# Patient Record
Sex: FEMALE | Race: BLACK OR AFRICAN AMERICAN | Employment: PART TIME | URBAN - METROPOLITAN AREA
[De-identification: names, ages, dates, MRNs, and addresses within clinical notes are randomized per-mention and may not be internally consistent; named-entity substitution may affect disease eponyms.]

---

## 2017-04-14 ENCOUNTER — HOSPITAL ENCOUNTER (OUTPATIENT)
Dept: MAMMOGRAPHY | Age: 42
Discharge: HOME | End: 2017-04-14
Payer: COMMERCIAL

## 2017-04-14 DIAGNOSIS — Z12.31 VISIT FOR SCREENING MAMMOGRAM: ICD-10-CM

## 2017-04-14 PROCEDURE — 77067 SCR MAMMO BI INCL CAD: CPT

## 2018-01-22 ENCOUNTER — HOSPITAL ENCOUNTER (OUTPATIENT)
Dept: LAB | Age: 43
Discharge: HOME | End: 2018-01-22

## 2018-01-22 LAB — SENTARA SPECIMEN COL,SENBCF: NORMAL

## 2018-01-22 PROCEDURE — 99001 SPECIMEN HANDLING PT-LAB: CPT | Performed by: FAMILY MEDICINE

## 2018-04-20 ENCOUNTER — HOSPITAL ENCOUNTER (OUTPATIENT)
Dept: MAMMOGRAPHY | Age: 43
Discharge: HOME | End: 2018-04-20
Payer: COMMERCIAL

## 2018-04-20 DIAGNOSIS — Z12.39 BREAST CANCER SCREENING: ICD-10-CM

## 2018-04-20 PROCEDURE — 77063 BREAST TOMOSYNTHESIS BI: CPT

## 2018-05-11 ENCOUNTER — HOSPITAL ENCOUNTER (OUTPATIENT)
Dept: ULTRASOUND IMAGING | Age: 43
Discharge: HOME | End: 2018-05-11
Payer: COMMERCIAL

## 2018-05-11 ENCOUNTER — HOSPITAL ENCOUNTER (OUTPATIENT)
Dept: MAMMOGRAPHY | Age: 43
Discharge: HOME | End: 2018-05-11
Payer: COMMERCIAL

## 2018-05-11 DIAGNOSIS — R92.8 ABNORMAL MAMMOGRAM: ICD-10-CM

## 2018-05-11 DIAGNOSIS — N63.0 BREAST MASS: ICD-10-CM

## 2018-05-11 PROCEDURE — 76642 ULTRASOUND BREAST LIMITED: CPT

## 2018-05-11 PROCEDURE — 77061 BREAST TOMOSYNTHESIS UNI: CPT

## 2018-09-24 ENCOUNTER — HOSPITAL ENCOUNTER (OUTPATIENT)
Dept: PHYSICAL THERAPY | Age: 43
Discharge: HOME | End: 2018-09-24
Payer: COMMERCIAL

## 2018-09-24 PROCEDURE — 97161 PT EVAL LOW COMPLEX 20 MIN: CPT | Performed by: PHYSICAL THERAPIST

## 2018-09-24 PROCEDURE — 97110 THERAPEUTIC EXERCISES: CPT | Performed by: PHYSICAL THERAPIST

## 2018-09-24 PROCEDURE — 97140 MANUAL THERAPY 1/> REGIONS: CPT | Performed by: PHYSICAL THERAPIST

## 2018-09-24 NOTE — PROGRESS NOTES
PT DAILY TREATMENT NOTE 12    Patient Name: Kiet Tejada  Date:2018  : 1975  [x]  Patient  Verified  Payor: Dylan Irizarry / Plan: [facility] / Product Type: Commerical /    In time:1000  Out time:1040  Total Treatment Time (min): 40  Visit #: 1 of 8    Treatment Area: Radiculopathy, cervical region [M54.12]    SUBJECTIVE  Pain Level (0-10 scale): 3-4  Any medication changes, allergies to medications, adverse drug reactions, diagnosis change, or new procedure performed?: [x] No    [] Yes (see summary sheet for update)  Subjective functional status/changes:   [x] No changes reported  See eval.    OBJECTIVE    10 min [x]Eval                  []Re-Eval       22 min Therapeutic Exercise:  [] See flow sheet :   Rationale: increase ROM, increase strength, improve coordination, improve balance and increase proprioception to improve the patients ability to tolerate daily activities with reduced symptoms. 8 min Manual Therapy:  SOR, DTM, STM to cervical musculature, manual cervical traction   Rationale: increase ROM, increase strength, improve coordination, improve balance and increase proprioception to improve the patients ability to tolerate daily activities with reduced symptoms. With   [] TE   [] TA   [] neuro   [] other: Patient Education: [x] Review HEP    [] Progressed/Changed HEP based on:   [] positioning   [] body mechanics   [] transfers   [] heat/ice application    [] other:      Other Objective/Functional Measures: See eval.     Pain Level (0-10 scale) post treatment: 1-2    ASSESSMENT/Changes in Function:  [x]  See Plan of Care           Progress towards goals / Updated goals:  Short Term Goals: To be accomplished in 2 weeks:  1. Pt to report [place] with HEP. Eval status: HEP issued and reviewed physically/verbally with pt    Long Term Goals: To be accomplished in 4 weeks:  1.   Pt to report score of 80 on FOTO, indicating improved tolerance to activity and reduced pain.   Eval status: 69 on initial FOTO  2. Pt to be able to tolerate a forearm plank x 1 minute to return to her work out tasks with improved activity tolerance. Eval status: pt limited to 10 seconds during forearm planks  4. Pt to demonstrate negative ULTT, indicating normal neurodynamics in the L UE and improved tolerance to activity and mobility. Eval status: positive ULTT for median, radial, and ulnar nerves  5. Pt to demonstrate full cervical ROM in all planes with no increased c/o pain to improve mobility and reduce fall risk.    Eval status: pt with 25% limited flexion, 25% limited extension, 25% limited R lateral flexion, 25% limited rotation R    PLAN  []  Upgrade activities as tolerated     [x]  Continue plan of care  []  Update interventions per flow sheet       []  Discharge due to:_  []  Other:_      Dyane Rom, PT 9/24/2018  11:24 AM    Future Appointments  Date Time Provider Elenita Cano   9/26/2018 3:00 PM Dutch Aranda, PT [facility]   10/3/2018 2:00 PM Dyane Rom, PT [facility]   10/5/2018 2:00 PM Alease Merles, PTA [facility]   10/10/2018 2:00 PM Dyane Rom, PT [facility]   10/12/2018 2:30 PM Alease Merles, PTA [facility]   10/17/2018 2:00 PM Alease Merles, PTA [facility]   10/19/2018 2:00 PM Alease Merles, PTA [facility]   10/24/2018 2:00 PM Dyane Rom, PT [facility]   10/26/2018 2:00 PM Alease Merles, PTA [facility]

## 2018-09-24 NOTE — PROGRESS NOTES
[facility] - [facility]  [address] [facility], [address] [phone] fax    Plan of Care/ Statement of Necessity for Physical Therapy Services           Patient name: Neil Dia Start of Care: 2018   Referral source: Brittanie Araujo MD : 1975    Medical Diagnosis: Radiculopathy, cervical region [M54.12]   Onset Date:2018    Treatment Diagnosis: cervical pain   Prior Hospitalization: see medical history Provider#: [id number]   Medications: Verified on Patient summary List    Comorbidities: HTN   Prior Level of Function: Pt reports full PLOF. 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 She works out and does yoga 3x per week. The Plan of Care and following information is based on the information from the initial evaluation. Assessment/ key information: Pt is a pleasant 38 y/o female who presents today with new onset of cervical and L UE symptoms after sleeping on a new, higher pillow early in 2018. Pt reports symptoms down the medial aspect of the arm and into the index finger DIP and other fingers with certain movements. Pt notes that she is currently unable to maintain a forearm plank for longer than 10 seconds (baseline is 1 minute), and is unable to tolerate yoga poses with WB UE. Pt denies any loss of strength, but notes difficulty with turning on the shower and with prolonged L UE tasks. Pt demonstrates slight reductions in cervical ROM, especially to the R. She notes positive results to all ULTT and notes symptom reduction with cervical traction/manual work today. Pt would benefit from skilled PT intervention to address the above limitations and return her to full PLOF.     Evaluation Complexity History MEDIUM  Complexity : 1-2 comorbidities / personal factors will impact the outcome/ POC ; Examination MEDIUM Complexity : 3 Standardized tests and measures addressing body structure, function, activity limitation and / or participation in recreation  ;Presentation LOW Complexity : Stable, uncomplicated  ;Clinical Decision Making MEDIUM Complexity : FOTO score of 26-74  Overall Complexity Rating: LOW   Problem List: pain affecting function, decrease ROM, decrease ADL/ functional abilitiies, decrease activity tolerance and decrease flexibility/ joint mobility   Treatment Plan may include any combination of the following: Therapeutic exercise, Therapeutic activities, Neuromuscular re-education, Physical agent/modality, Manual therapy, Patient education, Self Care training and Functional mobility training  Patient / Family readiness to learn indicated by: asking questions, trying to perform skills and interest  Persons(s) to be included in education: patient (P)  Barriers to Learning/Limitations: None  Patient Goal (s): my symptoms to go away  Patient Self Reported Health Status: good  Rehabilitation Potential: good  Short Term Goals: To be accomplished in 2 weeks:  1. Pt to report [place] with HEP. Eval status: HEP issued and reviewed physically/verbally with pt    Long Term Goals: To be accomplished in 4 weeks:  1. Pt to report score of 80 on FOTO, indicating improved tolerance to activity and reduced pain. Eval status: 69 on initial FOTO  2. Pt to be able to tolerate a forearm plank x 1 minute to return to her work out tasks with improved activity tolerance. Eval status: pt limited to 10 seconds during forearm planks  4. Pt to demonstrate negative ULTT, indicating normal neurodynamics in the L UE and improved tolerance to activity and mobility. Eval status: positive ULTT for median, radial, and ulnar nerves  5. Pt to demonstrate full cervical ROM in all planes with no increased c/o pain to improve mobility and reduce fall risk.    Eval status: pt with 25% limited flexion, 25% limited extension, 25% limited R lateral flexion, 25% limited rotation R    Frequency / Duration: Patient to be seen 2 times per week for 8 weeks. Patient/ Caregiver education and instruction: Diagnosis, prognosis, self care, activity modification and exercises   [x]  Plan of care has been reviewed with SIMA Neves, PT 9/24/2018 11:06 AM    ________________________________________________________________________    I certify that the above Therapy Services are being furnished while the patient is under my care. I agree with the treatment plan and certify that this therapy is necessary.     Physician's Signature:____________Date:_________TIME:________    ** Signature, Date and Time must be completed for valid certification **    Please sign and return to [facility] - [place] [address]  [address]   [facility], [address]  [phone] fax

## 2018-09-26 ENCOUNTER — HOSPITAL ENCOUNTER (OUTPATIENT)
Dept: PHYSICAL THERAPY | Age: 43
Discharge: HOME | End: 2018-09-26
Payer: COMMERCIAL

## 2018-09-26 PROCEDURE — 97110 THERAPEUTIC EXERCISES: CPT | Performed by: PHYSICAL THERAPIST

## 2018-09-26 PROCEDURE — 97112 NEUROMUSCULAR REEDUCATION: CPT | Performed by: PHYSICAL THERAPIST

## 2018-09-26 NOTE — PROGRESS NOTES
PT DAILY TREATMENT NOTE     Patient Name: Moni Petty  Date:2018  : 1975  [x]  Patient  Verified  Payor: Felicita Wakefield / Plan: [facility] / Product Type: Commerical /    In time:303  Out time:344  Total Treatment Time (min): 41  Visit #: 2 of 8    Treatment Area: Radiculopathy, cervical region [M54.12]    SUBJECTIVE  Pain Level (0-10 scale): 0  Any medication changes, allergies to medications, adverse drug reactions, diagnosis change, or new procedure performed?: [x] No    [] Yes (see summary sheet for update)  Subjective functional status/changes:   [] No changes reported  \"I feel better since starting therapy. I can tell the difference. \"    OBJECTIVE    30 min Therapeutic Exercise:  [x] See flow sheet :   Rationale: increase ROM, increase strength, improve coordination, improve balance and increase proprioception to improve the patients ability to tolerate daily activities with reduced symptoms.     11 min Neuromuscular Re-education:   [x] See flow sheet :   Rationale: increase ROM, increase strength, improve coordination, improve balance and increase proprioception to improve the patients ability to tolerate daily activities with reduced symptoms. With   [] TE   [] TA   [] neuro   [] other: Patient Education: [x] Review HEP    [] Progressed/Changed HEP based on:   [] positioning   [] body mechanics   [] transfers   [] heat/ice application    [] other:      Other Objective/Functional Measures: Pt requires visual and verbal cues for new exercises during initiation of POC today. Pain Level (0-10 scale) post treatment: 0    ASSESSMENT/Changes in Function: Pt notes benefit from initiation of POC today. She has slight onset of attempted forearm planks, which are halted as soon as symptoms occur. She is able to abolish nerve symptoms with Mulligan extension exercises. She has no symptoms at end of visit today.     Patient will continue to benefit from skilled PT services to modify and progress therapeutic interventions, address functional mobility deficits, address ROM deficits, address strength deficits, analyze and address soft tissue restrictions, analyze and cue movement patterns, analyze and modify body mechanics/ergonomics and assess and modify postural abnormalities to attain remaining goals. Progress towards goals / Updated goals:  Short Term Goals: To be accomplished in 2 weeks:  1. Pt to report [place] with HEP. Eval status: HEP issued and reviewed physically/verbally with pt     Long Term Goals: To be accomplished in 4 weeks:  1. Pt to report score of 80 on FOTO, indicating improved tolerance to activity and reduced pain. Eval status: 69 on initial FOTO  2. Pt to be able to tolerate a forearm plank x 1 minute to return to her work out tasks with improved activity tolerance. Eval status: pt limited to 10 seconds during forearm planks  4. Pt to demonstrate negative ULTT, indicating normal neurodynamics in the L UE and improved tolerance to activity and mobility. Eval status: positive ULTT for median, radial, and ulnar nerves  5. Pt to demonstrate full cervical ROM in all planes with no increased c/o pain to improve mobility and reduce fall risk.                         Eval status: pt with 25% limited flexion, 25% limited extension, 25% limited R lateral flexion, 25% limited rotation R    PLAN  []  Upgrade activities as tolerated     [x]  Continue plan of care  []  Update interventions per flow sheet       []  Discharge due to:_  []  Other:_      Francesca Stack PT 9/26/2018  3:20 PM    Future Appointments  Date Time Provider Elenita Cano   10/3/2018 2:00 PM [facility]   10/5/2018 2:00 PM Alexandra Weber PTA [facility]   10/10/2018 2:00 PM [facility]   10/12/2018 2:30 PM Alexandra Weber PTA [facility] 10/17/2018 2:00 PM Levon Blair PTA [facility]   10/19/2018 2:00 PM Levon Blair PTA [facility]   10/24/2018 2:00 PM Merrill Lombard, [place] [facility]   10/26/2018 2:00 PM [facility]

## 2018-10-03 ENCOUNTER — HOSPITAL ENCOUNTER (OUTPATIENT)
Dept: PHYSICAL THERAPY | Age: 43
Discharge: HOME | End: 2018-10-03
Payer: COMMERCIAL

## 2018-10-03 PROCEDURE — 97140 MANUAL THERAPY 1/> REGIONS: CPT | Performed by: PHYSICAL THERAPIST

## 2018-10-03 PROCEDURE — 97110 THERAPEUTIC EXERCISES: CPT | Performed by: PHYSICAL THERAPIST

## 2018-10-03 NOTE — PROGRESS NOTES
PT DAILY TREATMENT NOTE 10-18    Patient Name: Aaliyah Gilbert  Date:10/3/2018  : 1975  [x]  Patient  Verified  Payor: Catracho Huffman [facility] Product Type: Commer[facility] /    In time:202  Out time:240  Total Treatment Time (min): 38  Visit #: 3 of 8    Treatment Area: Radiculopathy, cervical region [M54.12]    SUBJECTIVE  Pain Level (0-10 scale): 0  Any medication changes, allergies to medications, adverse drug reactions, diagnosis change, or new procedure performed?: [x] No    [] Yes (see summary sheet for update)  Subjective functional status/changes:   [] No changes reported  \"I'm feeling better! I can go further with my nerve stretches and am having less symptoms. \"    OBJECTIVE     30 min Therapeutic Exercise:  [x] See flow sheet :   Rationale: increase ROM, increase strength, improve coordination, improve balance and increase proprioception to improve the patients ability to tolerate daily activities with reduced symptoms.        8 min Manual Therapy: PT provides manual cervical traction, SOR, STM to cervical spinal musculature   Rationale: increase ROM, increase strength, improve coordination, improve balance and increase proprioception to improve the patients ability to tolerate daily activities with reduced symptoms.         With   [] TE   [] TA   [] neuro   [] other: Patient Education: [x] Review HEP    [] Progressed/Changed HEP based on:   [] positioning   [] body mechanics   [] transfers   [] heat/ice application    [] other:       Other Objective/Functional Measures:        Pain Level (0-10 scale) post treatment: 0     ASSESSMENT/Changes in Function: Pt tolerates visit well.   She is noting improved range of motion and activity tolerance, especially with tasks that place tension on her upper extremity nerves.     Patient will continue to benefit from skilled PT services to modify and progress therapeutic interventions, address functional mobility deficits, address ROM deficits, address strength deficits, analyze and address soft tissue restrictions, analyze and cue movement patterns, analyze and modify body mechanics/ergonomics and assess and modify postural abnormalities to attain remaining goals.      Progress towards goals / Updated goals:  Short Term Goals: To be accomplished in 2 weeks:  1.  Pt to report [place] with HEP.                       Eval status: HEP issued and reviewed physically/verbally with pt      Long Term Goals: To be accomplished in 4 weeks:  1.  Pt to report score of 80 on FOTO, indicating improved tolerance to activity and reduced pain.                        Eval status: 69 on initial FOTO  2. Pt to be able to tolerate a forearm plank x 1 minute to return to her work out tasks with improved activity tolerance.                         Eval status: pt limited to 10 seconds during forearm planks  4.  Pt to demonstrate negative ULTT, indicating normal neurodynamics in the [facility] [address] and improved tolerance to activity and mobility.                         Eval status: positive ULTT for median, radial, and ulnar nerves  5.  Pt to demonstrate full cervical ROM in all planes with no increased c/o pain to improve mobility and reduce fall risk.                        Eval status: pt with 25% limited flexion, 25% limited extension, 25% limited R lateral flexion, 25% limited rotation R    PLAN  []  Upgrade activities as tolerated     [x]  Continue plan of care  []  Update interventions per flow sheet       []  Discharge due to:_  []  Other:_      Scout Jones PT 10/3/2018  2:22 PM    Future Appointments  Date Time Provider Elenita Cano   10/5/2018 2:00 PM [facility]   10/10/2018 2:00 PM [facility]   10/12/2018 2:30 PM Quincy Chirinos PTA [facility]   10/17/2018 2:00 PM Quincy Chirinos PTA [facility]   10/19/2018 2:00 PM Quincy Chirinos PTA [facility]   10/24/2018 2:00 PM Scout Jones PT [facility]   10/26/2018 2:00 PM Gama Begun, SIMA [facility]

## 2018-10-05 ENCOUNTER — HOSPITAL ENCOUNTER (OUTPATIENT)
Dept: PHYSICAL THERAPY | Age: 43
Discharge: HOME | End: 2018-10-05
Payer: COMMERCIAL

## 2018-10-05 PROCEDURE — 97110 THERAPEUTIC EXERCISES: CPT

## 2018-10-05 PROCEDURE — 97112 NEUROMUSCULAR REEDUCATION: CPT

## 2018-10-05 PROCEDURE — 97140 MANUAL THERAPY 1/> REGIONS: CPT

## 2018-10-05 NOTE — PROGRESS NOTES
PT DAILY TREATMENT NOTE 10-18    Patient Name: Beata Sherwood  Date:10/5/2018  : 1975  [x]  Patient  Verified  Payor: Varun Lozano / Plan: [facility] / Product Type: Commerical /    In time:200  Out time:238  Total Treatment Time (min): 38  Visit #: 4 of 8      Treatment Area: Radiculopathy, cervical region [M54.12]    SUBJECTIVE  Pain Level (0-10 scale): 0  Any medication changes, allergies to medications, adverse drug reactions, diagnosis change, or new procedure performed?: [x] No    [] Yes (see summary sheet for update)  Subjective functional status/changes:   [] No changes reported  \"No pain. \"    OBJECTIVE    Modality rationale: patient declined   Min Type Additional Details    [] Estim:  []Unatt       []IFC  []Premod                        []Other:  []w/ice   []w/heat  Position:  Location:    [] Estim: []Att    []TENS instruct  []NMES                    []Other:  []w/US   []w/ice   []w/heat  Position:  Location:    []  Traction: [] Cervical       []Lumbar                       [] Prone          []Supine                       []Intermittent   []Continuous Lbs:  [] before manual  [] after manual    []  Ultrasound: []Continuous   [] Pulsed                           []1MHz   []3MHz W/cm2:  Location:    []  Iontophoresis with dexamethasone         Location: [] Take home patch   [] In clinic    []  Ice     []  heat  []  Ice massage  []  Laser   []  Anodyne Position:  Location:    []  Laser with stim  []  Other:  Position:  Location:    []  Vasopneumatic Device Pressure:       [] lo [] med [] hi   Temperature: [] lo [] med [] hi   [] Skin assessment post-treatment:  []intact []redness- no adverse reaction    []redness - adverse reaction:     10 min Therapeutic Exercise:  [x] See flow sheet :   Rationale: increase ROM and increase strength to improve the patients ability to perform ADLs    20 min Neuromuscular Re-education:  [x]  See flow sheet : postural re-ed activities   Rationale: increase ROM, increase strength, improve coordination, improve balance and increase proprioception  to improve the patients ability to improve mobility and upright posture     8 min Manual Therapy:  SOR, STM to c/s paraspinals and B UT, gentle manual cervical traction   Rationale: decrease pain, increase ROM, increase tissue extensibility, decrease trigger points and increase postural awareness to improve mobility and positional tolerance          With   [x] TE   [] TA   [x] neuro   [] other: Patient Education: [x] Review HEP    [] Progressed/Changed HEP based on:   [x] positioning   [x] body mechanics   [] transfers   [] heat/ice application    [] other:      Other Objective/Functional Measures:      Pain Level (0-10 scale) post treatment: 0    ASSESSMENT/Changes in Function: Pt reports improvements with pain and radicular sx recently. She reports that she was able to complete table push ups without sx. Her mobility and posture have improved nicely. Patient will continue to benefit from skilled PT services to modify and progress therapeutic interventions, address functional mobility deficits, address ROM deficits, address strength deficits, analyze and address soft tissue restrictions, analyze and cue movement patterns, analyze and modify body mechanics/ergonomics, assess and modify postural abnormalities, address imbalance/dizziness and instruct in home and community integration to attain remaining goals. [x]  See Plan of Care  []  See progress note/recertification  []  See Discharge Summary         Progress towards goals / Updated goals:  Short Term Goals: To be accomplished in 2 weeks:  1.  Pt to report [place] with HEP. Eval status: HEP issued and reviewed physically/verbally with pt     MET  Long Term Goals: To be accomplished in 4 weeks:  1.  Pt to report score of 80 on FOTO, indicating improved tolerance to activity and reduced pain.    Eval status: 69 on initial FOTO   Assess at 30 day [place]  2. Pt to be able to tolerate a forearm plank x 1 minute to return to her work out tasks with improved activity tolerance. Eval status: pt limited to 10 seconds during forearm planks   Attempt NV  4.  Pt to demonstrate negative ULTT, indicating normal neurodynamics in the L UE and improved tolerance to activity and mobility. Eval status: positive ULTT for median, radial, and ulnar nerves   Reports improved radicular sx  5.  Pt to demonstrate full cervical ROM in all planes with no increased c/o pain to improve mobility and reduce fall risk.    Eval status: pt with 25% limited flexion, 25% limited extension, 25% limited R lateral flexion, 25% limited rotation R   Improving with motion     PLAN  []  Upgrade activities as tolerated     [x]  Continue plan of care  []  Update interventions per flow sheet       []  Discharge due to:_  []  Other:_      Peter Brito PTA, [facility] 10/5/2018  2:42 PM    Future Appointments  Date Time Provider Elenita Cano   10/10/2018 2:00 PM [facility]   10/12/2018 2:30 PM [facility]   10/16/2018 2:00 PM [address] [place] [address] [facility]   10/19/2018 2:00 PM Peter Brito PTA [facility]   10/22/2018 2:00 PM [facility]   10/26/2018 2:00 PM [facility]

## 2018-10-10 ENCOUNTER — HOSPITAL ENCOUNTER (OUTPATIENT)
Dept: PHYSICAL THERAPY | Age: 43
Discharge: HOME | End: 2018-10-10
Payer: COMMERCIAL

## 2018-10-10 PROCEDURE — 97110 THERAPEUTIC EXERCISES: CPT | Performed by: PHYSICAL THERAPIST

## 2018-10-10 PROCEDURE — 97112 NEUROMUSCULAR REEDUCATION: CPT | Performed by: PHYSICAL THERAPIST

## 2018-10-10 NOTE — PROGRESS NOTES
PT DAILY TREATMENT NOTE 10-18    Patient Name: James Gaspar  Date:10/10/2018  : 1975  [x]  Patient  Verified  Payor: Estela Prieto / [facility] / Product Type: Commerical /    In time:202  Out time:240  Total Treatment Time (min): 38  Visit #: 5 of 8    Treatment Area: Radiculopathy, cervical region [M54.12]    SUBJECTIVE  Pain Level (0-10 scale): 0  Any medication changes, allergies to medications, adverse drug reactions, diagnosis change, or new procedure performed?: [x] No    [] Yes (see summary sheet for update)  Subjective functional status/changes:   [x] No changes reported    OBJECTIVE      23 min Therapeutic Exercise:  [x] See flow sheet :   Rationale: increase ROM, increase strength, improve coordination, improve balance and increase proprioception to improve the patients ability to tolerate daily activities with reduced symptoms.      15 min Neuromuscular Re-education:  [x] See flow sheet :   Rationale: increase ROM, increase strength, improve coordination, improve balance and increase proprioception to improve the patients ability to tolerate daily activities with reduced symptoms. With   [] TE   [] TA   [] neuro   [] other: Patient Education: [x] Review HEP    [] Progressed/Changed HEP based on:   [] positioning   [] body mechanics   [] transfers   [] heat/ice application    [] other:        Other Objective/Functional Measures:   See updated goals.      Pain Level (0-10 scale) post treatment: 0      ASSESSMENT/Changes in Function: Pt tolerates visit well. She is able to perform forearm planks from her toes today with only minimal onset of UE symptoms from neural tension.   She is pleased with her progress thus far.      Patient will continue to benefit from skilled PT services to modify and progress therapeutic interventions, address functional mobility deficits, address ROM deficits, address strength deficits, analyze and address soft tissue restrictions, analyze and cue movement patterns, analyze and modify body mechanics/ergonomics and assess and modify postural abnormalities to attain remaining goals.      Progress towards goals / Updated goals:  Short Term Goals: To be accomplished in 2 weeks:  1.  Pt to report [place] with HEP.                       Eval status: HEP issued and reviewed physically/verbally with pt      Long Term Goals: To be accomplished in 4 weeks:  1.  Pt to report score of 80 on FOTO, indicating improved tolerance to activity and reduced pain.                        Eval status: 69 on initial FOTO  2. Pt to be able to tolerate a forearm plank x 1 minute to return to her work out tasks with improved activity tolerance.                         Eval status: pt limited to 10 seconds during forearm planks    Current: able to hold a plank from toes x 30 seconds  4.  Pt to demonstrate negative ULTT, indicating normal neurodynamics in the L UE and improved tolerance to activity and mobility.                         Eval status: positive ULTT for median, radial, and ulnar nerves  5.  Pt to demonstrate full cervical ROM in all planes with no increased c/o pain to improve mobility and reduce fall risk.                        Eval status: pt with 25% limited flexion, 25% limited extension, 25% limited R lateral flexion, 25% limited rotation R    PLAN  []  Upgrade activities as tolerated     [x]  Continue plan of care  []  Update interventions per flow sheet       []  Discharge due to:_  []  Other:_      Stone Shine, PT 10/10/2018  2:22 PM    Future Appointments  Date Time Provider Elenita Cano   10/12/2018 2:30 PM Blanche Johnson PTA [facility]   10/16/2018 2:00 PM [facility] PT [address] [facility]   10/19/2018 2:00 PM SIMA Hansen [facility]   10/22/2018 2:00 PM SIMA Hansen [facility]   10/26/2018 2:00 PM [facility]

## 2018-10-12 ENCOUNTER — HOSPITAL ENCOUNTER (OUTPATIENT)
Dept: PHYSICAL THERAPY | Age: 43
Discharge: HOME | End: 2018-10-12
Payer: COMMERCIAL

## 2018-10-12 PROCEDURE — 97110 THERAPEUTIC EXERCISES: CPT

## 2018-10-12 PROCEDURE — 97112 NEUROMUSCULAR REEDUCATION: CPT

## 2018-10-12 NOTE — PROGRESS NOTES
PT DAILY TREATMENT NOTE 10-18    Patient Name: Vanita Valles  Date:10/12/2018  : 1975  [x]  Patient  Verified  Payor: Mary Spears / [facility] Product Type: Commer[facility] /    In time:200  Out time:240  Total Treatment Time (min): 40  Visit #: 6 of 8    Treatment Area: Radiculopathy, cervical region [M54.12]    SUBJECTIVE  Pain Level (0-10 scale): 0  Any medication changes, allergies to medications, adverse drug reactions, diagnosis change, or new procedure performed?: [x] No    [] Yes (see summary sheet for update)  Subjective functional status/changes:   [] No changes reported  \"No pain at all. \"    OBJECTIVE    Modality rationale: patient declined   Min Type Additional Details    [] Estim:  []Unatt       []IFC  []Premod                        []Other:  []w/ice   []w/heat  Position:  Location:    [] Estim: []Att    []TENS instruct  []NMES                    []Other:  []w/US   []w/ice   []w/heat  Position:  Location:    []  Traction: [] Cervical       []Lumbar                       [] Prone          []Supine                       []Intermittent   []Continuous Lbs:  [] before manual  [] after manual    []  Ultrasound: []Continuous   [] Pulsed                           []1MHz   []3MHz W/cm2:  Location:    []  Iontophoresis with dexamethasone         Location: [] Take home patch   [] In clinic    []  Ice     []  heat  []  Ice massage  []  Laser   []  Anodyne Position:  Location:    []  Laser with stim  []  Other:  Position:  Location:    []  Vasopneumatic Device Pressure:       [] lo [] med [] hi   Temperature: [] lo [] med [] hi   [] Skin assessment post-treatment:  []intact []redness- no adverse reaction    []redness - adverse reaction:     15 min Therapeutic Exercise:  [x] See flow sheet :   Rationale: increase ROM and increase strength to improve the patients ability to perform ADLs    25 min Neuromuscular Re-education:  [x]  See flow sheet : postural re-ed activities   Rationale: increase ROM, increase strength, improve coordination, improve balance and increase proprioception  to improve the patients ability to improve mobility and upright posture        With   [x] TE   [] TA   [x] neuro   [] other: Patient Education: [x] Review HEP    [] Progressed/Changed HEP based on:   [x] positioning   [x] body mechanics   [] transfers   [] heat/ice application    [] other:      Other Objective/Functional Measures:      Pain Level (0-10 scale) post treatment: 0    ASSESSMENT/Changes in Function: Pt reports improvements with pain and radicular sx. She reports that she was having radicular sx with planks when first starting therapy, but no longer has radicular sx during planks. We will likely transition to an updated HEP at the end of the current POC. Patient will continue to benefit from skilled PT services to modify and progress therapeutic interventions, address functional mobility deficits, address ROM deficits, address strength deficits, analyze and address soft tissue restrictions, analyze and cue movement patterns, analyze and modify body mechanics/ergonomics, assess and modify postural abnormalities, address imbalance/dizziness and instruct in home and community integration to attain remaining goals. [x]  See Plan of Care  []  See progress note/recertification  []  See Discharge Summary         Progress towards goals / Updated goals:  Short Term Goals: To be accomplished in 2 weeks:  1.  Pt to report [place] with HEP. Eval status: HEP issued and reviewed physically/verbally with pt   [address] be accomplished in 4 weeks:  1.  Pt to report score of 80 on FOTO, indicating improved tolerance to activity and reduced pain. Eval status: 69 on initial FOTO   Assess at 30 day [place]  2. Pt to be able to tolerate a forearm plank x 1 minute to return to her work out tasks with improved activity tolerance.     Eval status: pt limited to 10 seconds during forearm planks   Current: able to hold a plank from toes x 30 seconds  4.  Pt to demonstrate negative ULTT, indicating normal neurodynamics in the L UE and improved tolerance to activity and mobility. Eval status: positive ULTT for median, radial, and ulnar nerves   Reports improvements with radicular sx  5.  Pt to demonstrate full cervical ROM in all planes with no increased c/o pain to improve mobility and reduce fall risk.    Eval status: pt with 25% limited flexion, 25% limited extension, 25% limited R lateral flexion, 25% limited rotation R   Improving wonderfully with motion    PLAN  []  Upgrade activities as tolerated     [x]  Continue plan of care  []  Update interventions per flow sheet       []  Discharge due to:_  []  Other:_      Martin Bernal PTA, [facility] 10/12/2018  2:44 PM    Future Appointments  Date Time Provider Elenita Cano   10/12/2018 2:30 PM [facility]   10/16/2018 2:00 PM [facility] PT [address] [facility]   10/19/2018 2:00 PM SIMA Rasmussen [facility]   10/22/2018 2:00 PM [facility]   10/26/2018 2:00 PM Martin Bernal PTA [facility]

## 2018-10-16 ENCOUNTER — HOSPITAL ENCOUNTER (OUTPATIENT)
Dept: PHYSICAL THERAPY | Age: 43
Discharge: HOME | End: 2018-10-16
Payer: COMMERCIAL

## 2018-10-16 PROCEDURE — 97112 NEUROMUSCULAR REEDUCATION: CPT

## 2018-10-16 PROCEDURE — 97110 THERAPEUTIC EXERCISES: CPT

## 2018-10-17 ENCOUNTER — APPOINTMENT (OUTPATIENT)
Dept: PHYSICAL THERAPY | Age: 43
End: 2018-10-17
Payer: COMMERCIAL

## 2018-10-19 ENCOUNTER — HOSPITAL ENCOUNTER (OUTPATIENT)
Dept: PHYSICAL THERAPY | Age: 43
Discharge: HOME | End: 2018-10-19
Payer: COMMERCIAL

## 2018-10-19 PROCEDURE — 97110 THERAPEUTIC EXERCISES: CPT

## 2018-10-19 PROCEDURE — 97112 NEUROMUSCULAR REEDUCATION: CPT

## 2018-10-19 NOTE — PROGRESS NOTES
PT DAILY TREATMENT NOTE 10-18    Patient Name: Carlo Alvarez  Date:10/19/2018  : 1975  [x]  Patient  Verified  Payor: Teresa Knight / Plan: [place] [facility] / Product Type: Commerical /    In time:200  Out time:235  Total Treatment Time (min): 35  Visit #: 8 of 8    Treatment Area: Radiculopathy, cervical region [M54.12]    SUBJECTIVE  Pain Level (0-10 scale): 0  Any medication changes, allergies to medications, adverse drug reactions, diagnosis change, or new procedure performed?: [x] No    [] Yes (see summary sheet for update)  Subjective functional status/changes:   [] No changes reported  \"No pain. \"    OBJECTIVE    Modality rationale: patient declined   Type Additional Details   [] Estim:  []Unatt       []IFC  []Premod                        []Other:  []w/ice   []w/heat  Position:  Location:   [] Estim: []Att    []TENS instruct  []NMES                    []Other:  []w/US   []w/ice   []w/heat  Position:  Location:   []  Traction: [] Cervical       []Lumbar                       [] Prone          []Supine                       []Intermittent   []Continuous Lbs:  [] before manual  [] after manual   []  Ultrasound: []Continuous   [] Pulsed                           []1MHz   []3MHz W/cm2:  Location:   []  Iontophoresis with dexamethasone         Location: [] Take home patch   [] In clinic   []  Ice     []  heat  []  Ice massage  []  Laser   []  Anodyne Position:  Location:   []  Laser with stim  []  Other:  Position:  Location:   []  Vasopneumatic Device Pressure:       [] lo [] med [] hi   Temperature: [] lo [] med [] hi   [] Skin assessment post-treatment:  []intact []redness- no adverse reaction    []redness - adverse reaction:     10 min Therapeutic Exercise:  [x] See flow sheet :   Rationale: increase ROM and increase strength to improve the patients ability to perform ADLs    25 min Neuromuscular Re-education:  [x]  See flow sheet : postural re-ed activities   Rationale: increase ROM, increase strength, improve coordination, improve balance and increase proprioception  to improve the patients ability to improve mobility and upright posture        With   [x] TE   [] TA   [x] neuro   [] other: Patient Education: [x] Review HEP    [] Progressed/Changed HEP based on:   [x] positioning   [x] body mechanics   [] transfers   [] heat/ice application    [] other:      Other Objective/Functional Measures:   FOTO 96     Pain Level (0-10 scale) post treatment: 0    ASSESSMENT/Changes in Function: Ms. Scott Teran has been a pleasure to treat and reports 95% improvement since beginning therapy. Pt reports ease with all ADLs and has returned to the gym. She reports no radicular sx and has been pain free recently. She reports no functional deficits at this time. We are discharging to an updated HEP at this time. []  See Plan of Care  []  See progress note/recertification  [x]  See Discharge Summary         Progress towards goals / Updated goals:  Short Term Goals: To be accomplished in 2 weeks:  1.  Pt to report [place] with HEP.                       Eval status: HEP issued and reviewed physically/verbally with pt                        KAREN    Long Term Goals: To be accomplished in 4 weeks:  1.  Pt to report score of 80 on FOTO, indicating improved tolerance to activity and reduced pain.                        Eval status: 69 on initial FOTO                        MET; 96  2. Pt to be able to tolerate a forearm plank x 1 minute to return to her work out tasks with improved activity tolerance.                         Eval status: pt limited to 10 seconds during forearm planks                        MET  4.  Pt to demonstrate negative ULTT, indicating normal neurodynamics in the L UE and improved tolerance to activity and mobility.                         Eval status: positive ULTT for median, radial, and ulnar nerves                        MET  5.  Pt to demonstrate full cervical ROM in all planes with no increased c/o pain to improve mobility and reduce fall risk.                        Eval status: pt with 25% limited flexion, 25% limited extension, 25% limited R lateral flexion, 25% limited rotation R                        MET    Functional Gains: planks, pilates, yoga, returning to the gym, ADLs, pain, radicular sx  Functional Deficits: none to report  % improvement: 95%  Pain   Average: 0/10       Best: 0/10     Worst: 0/10  Patient Goal: \"do weight bearing exercises without having tingling in my hands.  \"    PLAN  []  Upgrade activities as tolerated     []  Continue plan of care  []  Update interventions per flow sheet       [x]  Discharge due to: [address]  []  Other:_      Gama Felder PTA, CSCS 10/19/2018  2:38 PM    Future Appointments   Date Time Provider Elenita Cano   10/19/2018  2:00 PM [facility]les [facility]

## 2018-10-22 NOTE — PROGRESS NOTES
[facility]  [address], [address]  [phone] fax    Discharge Summary  Patient name: Laure Hodge Start of Care: 2018   Referral source: Alison Armijo MD : 1975                Medical Diagnosis: Radiculopathy, cervical region [M54.12]    Onset Date:2018                Treatment Diagnosis: cervical pain   Prior Hospitalization: see medical history Provider#: [id number]   Medications: Verified on Patient summary List    Comorbidities: HTN   Prior Level of Function: Pt reports full PLOF. 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 She works out and does yoga 3x per week. Visits from Start of Care: 8    Missed Visits: 0    Reporting Period : 18 to 10/19/18    Short Term Goals: To be accomplished in 2 weeks:  1.  Pt to report [place] with HEP.                       Eval status: HEP issued and reviewed physically/verbally with pt                        [facility]    Long Term Goals: To be accomplished in 4 weeks:  1.  Pt to report score of 80 on FOTO, indicating improved tolerance to activity and reduced pain.                        Eval status: 69 on initial FOTO                        MET; 96  2. Pt to be able to tolerate a forearm plank x 1 minute to return to her work out tasks with improved activity tolerance.                         Eval status: pt limited to 10 seconds during forearm planks                        MET  4.  Pt to demonstrate negative ULTT, indicating normal neurodynamics in the L UE and improved tolerance to activity and mobility.                         Eval status: positive ULTT for median, radial, and ulnar nerves                        MET  5.  Pt to demonstrate full cervical ROM in all planes with no increased c/o pain to improve mobility and reduce fall risk.                        Eval status: pt with 25% limited flexion, 25% limited extension, 25% limited R lateral flexion, 25% limited rotation R                        MET     Functional Gains: planks, pilates, yoga, returning to the gym, ADLs, pain, radicular sx  Functional Deficits: none to report  % improvement: 95%  Pain   Average: 0/10                  Best: 0/10                Worst: 0/10  Patient Goal: \"do weight bearing exercises without having tingling in my hands. \"    Assessment/Summary of care: Ms. Raji Gomes has been a pleasure to treat and reports 95% improvement since beginning therapy. Pt reports ease with all ADLs and has returned to the gym. She reports no radicular sx and has been pain free recently. She reports no functional deficits at this time. We are discharging to an updated HEP at this time.       RECOMMENDATIONS:  [x]Discontinue therapy: [x]Patient has reached or is progressing toward set goals      []Patient is non-compliant or has abdicated      []Due to lack of appreciable progress towards set goals    Maryam Cuevas, PT 10/22/2018 9:49 AM

## 2018-10-24 ENCOUNTER — APPOINTMENT (OUTPATIENT)
Dept: PHYSICAL THERAPY | Age: 43
End: 2018-10-24
Payer: COMMERCIAL

## 2018-10-26 ENCOUNTER — APPOINTMENT (OUTPATIENT)
Dept: PHYSICAL THERAPY | Age: 43
End: 2018-10-26
Payer: COMMERCIAL

## 2019-02-27 ENCOUNTER — HOSPITAL ENCOUNTER (OUTPATIENT)
Dept: LAB | Age: 44
Discharge: HOME | End: 2019-02-27

## 2019-02-27 LAB — SENTARA SPECIMEN COL,SENBCF: NORMAL

## 2019-02-27 PROCEDURE — 99001 SPECIMEN HANDLING PT-LAB: CPT

## 2019-04-26 ENCOUNTER — HOSPITAL ENCOUNTER (OUTPATIENT)
Dept: MAMMOGRAPHY | Age: 44
Discharge: HOME | End: 2019-04-26
Payer: COMMERCIAL

## 2019-04-26 DIAGNOSIS — Z12.31 VISIT FOR SCREENING MAMMOGRAM: ICD-10-CM

## 2019-04-26 PROCEDURE — 77063 BREAST TOMOSYNTHESIS BI: CPT

## 2020-06-19 ENCOUNTER — HOSPITAL ENCOUNTER (OUTPATIENT)
Dept: MAMMOGRAPHY | Age: 45
Discharge: HOME | End: 2020-06-19
Payer: COMMERCIAL

## 2020-06-19 DIAGNOSIS — Z12.31 VISIT FOR SCREENING MAMMOGRAM: ICD-10-CM

## 2020-06-19 PROCEDURE — 77063 BREAST TOMOSYNTHESIS BI: CPT

## 2020-07-01 ENCOUNTER — HOSPITAL ENCOUNTER (OUTPATIENT)
Dept: PHYSICAL THERAPY | Age: 45
Discharge: HOME | End: 2020-07-01
Payer: COMMERCIAL

## 2020-07-01 PROCEDURE — 97530 THERAPEUTIC ACTIVITIES: CPT

## 2020-07-01 PROCEDURE — 97110 THERAPEUTIC EXERCISES: CPT

## 2020-07-01 PROCEDURE — 97161 PT EVAL LOW COMPLEX 20 MIN: CPT

## 2020-07-01 NOTE — PROGRESS NOTES
[facility] [address]  [address], [address] [phone] fax    Plan of Care/ Statement of Necessity for Physical Therapy Services           Patient name: Jeremiah Mustafa Start of Care: 2020   Referral source: Joseph Hoyos MD : 1975    Medical Diagnosis: Pain in right knee [M25.561]  Pain in left knee [M25.562]  Payor: Orquidea Claire / Plan: [address] [place] HMO / Product Type: HMO /  Onset Date:Aug 2019    Treatment Diagnosis: B knee pain   Prior Hospitalization: see medical history Provider#: [id number]   Medications: Verified on Patient summary List    Comorbidities: HTN   Prior Level of Function: legal work part-time. Functionally independent, taking ballet class, gym workout, yoga    The Plan of Care and following information is based on the information from the initial evaluation. Assessment/ key information: Patient is a 50 y. o.female presenting with Pain in right knee [M25.561]  Pain in left knee [M25.562]. Ms. Melody Blackman presents to initial PT evaluation with c/o B knee pain worsening on and off since beginning a new adult ballet class. She displays altered patellar tracking, B pes planus with hypermobility of B midtarsal joint, and mild glut/hip weakness. Ligamentous and meniscal integrity testing unremarkable. Symptoms consistent with patellofemoral syndrome. Patient will benefit from skilled PT services to address deficits and facilitate return to premorbid activity level and promote improved quality of life. Evaluation Complexity History LOW Complexity : Zero comorbidities / personal factors that will impact the outcome / POC; Examination LOW Complexity : 1-2 Standardized tests and measures addressing body structure, function, activity limitation and / or participation in recreation  ;Presentation MEDIUM Complexity : Evolving with changing characteristics  ; Clinical Decision Making MEDIUM Complexity : FOTO score of 26-74  Overall Complexity Rating: LOW   Problem List: pain affecting function, decrease ROM, decrease strength, edema affecting function, impaired gait/ balance, decrease ADL/ functional abilitiies, decrease activity tolerance, decrease flexibility/ joint mobility and decrease transfer abilities   Treatment Plan may include any combination of the following: Therapeutic exercise, Therapeutic activities, Neuromuscular re-education, Physical agent/modality, Gait/balance training, Manual therapy, Aquatic therapy, Patient education, Self Care training, Functional mobility training, Home safety training and Stair training  Patient / Family readiness to learn indicated by: asking questions, trying to perform skills and interest  Persons(s) to be included in education: patient (P)  Barriers to Learning/Limitations: None  Patient Goal (s): less pain, more mobility.   Patient Self Reported Health Status: good  Rehabilitation Potential: good  Short Term Goals: To be accomplished in 1 weeks:  1. Therapist to establish HEP for strength and ROM to improve ease with ADLs/gait. Long Term Goals: To be accomplished in 4 weeks:  1. Patient will be independent with HEP to improve carryover of functional gains with ADLs between visits. Eval Status:n/a  2. . Pt will increase FOTO score to 78 points to demonstrate improved functional mobility. Eval Status: FOTO: 61  3. Pt will increase B knee extension/hip extension/ hip flexion/hip abduction strength to grossly 5/5 to improve ease with gait. Eval Status:    Knee extension: 4+/5    Hip extension: 3/5   Hip flexion:4+/5    Hip abduction: 4+/5  4. Patient will display proper jump/ jump form without cuing from therapist to improve ease with ballet workouts. Frequency / Duration: Patient to be seen 2 times per week for 4 weeks.     Patient/ Caregiver education and instruction: Diagnosis, prognosis, self care, activity modification and exercises   [x]  Plan of care has been reviewed with SIMA Holloway, PT 7/1/2020 2:57 PM    ________________________________________________________________________    I certify that the above Therapy Services are being furnished while the patient is under my care. I agree with the treatment plan and certify that this therapy is necessary.     Physician's Signature:____________Date:_________TIME:________    ** Signature, Date and Time must be completed for valid certification **    Please sign and return to [facility] 45  [address], [address] [phone] fax

## 2020-07-01 NOTE — PROGRESS NOTES
PT DAILY TREATMENT NOTE 8-14    Patient Name: Carol Galeano  Date:2020  : 1975  [x]  Patient  Verified  Payor: Ela Wiseman / Plan: [address] [employer] / Product Type: HMO /    In time:330  Out time:415  Total Treatment Time (min): 45  Visit #: 1 of 8    Treatment Area: Pain in right knee [M25.561]  Pain in left knee [M25.562]    SUBJECTIVE  Pain Level (0-10 scale): 4  Any medication changes, allergies to medications, adverse drug reactions, diagnosis change, or new procedure performed?: [x] No    [] Yes (see summary sheet for update)  Subjective functional status/changes:   [x] See Eval form in paper chart     OBJECTIVE      10 min [x]Eval                  []Re-Eval         10 min Therapeutic Exercise:  [x] See flow sheet :   Rationale: increase ROM, increase strength, improve coordination, improve balance and increase proprioception to improve the patients ability to normalize gait/perform ADLs. 25 min Therapeutic Activity:  [x]  See flow sheet :review of body mechanics with ballet asks, squatting, lifting, jumping form. Review of proper footwear and foot type/insert to improve patellar alignment and tracking   Rationale: increase ROM, increase strength, improve coordination, improve balance and increase proprioception  to improve the patients ability to perform daily activities without pain. With   [] TE   [] TA   [] neuro   [] other: Patient Education: [x] Review HEP    [] Progressed/Changed HEP based on:   [] positioning   [] body mechanics   [] transfers   [] heat/ice application    [] other:           Pain Level (0-10 scale) post treatment: 4    ASSESSMENT:   [x]  See Evaluation          Goals:  Short Term Goals: To be accomplished in 1 weeks:  1. Therapist to establish HEP for strength and ROM to improve ease with ADLs/gait. Long Term Goals: To be accomplished in 4 weeks:  1. Patient will be independent with HEP to improve carryover of functional gains with ADLs between visits. Eval Status:n/a  2. . Pt will increase FOTO score to 78 points to demonstrate improved functional mobility. Eval Status: FOTO: 61  3. Pt will increase B knee extension/hip extension/ hip flexion/hip abduction strength to grossly 5/5 to improve ease with gait. Eval Status:    Knee extension: 4+/5    Hip extension: 3/5   Hip flexion:4+/5    Hip abduction: 4+/5  4. Patient will display proper jump/ jump form without cuing from therapist to improve ease with ballet workouts.      PLAN      [x]  Continue plan of care           Vani Tejada, PT 7/1/2020  2:58 PM

## 2020-07-09 ENCOUNTER — HOSPITAL ENCOUNTER (OUTPATIENT)
Dept: PHYSICAL THERAPY | Age: 45
Discharge: HOME | End: 2020-07-09
Payer: COMMERCIAL

## 2020-07-09 PROCEDURE — 97530 THERAPEUTIC ACTIVITIES: CPT

## 2020-07-09 PROCEDURE — 97110 THERAPEUTIC EXERCISES: CPT

## 2020-07-09 PROCEDURE — 97112 NEUROMUSCULAR REEDUCATION: CPT

## 2020-07-09 NOTE — PROGRESS NOTES
PT DAILY TREATMENT NOTE 10-18    Patient Name: Kenia De Paz  Date:2020  : 1975  [x]  Patient  Verified  Payor: Henrique Gonzalez / Plan: [address] [place] [address] [employer] / Product Type: HMO /    In time:115  Out time:213  Total Treatment Time (min): 62  Visit #: 2 of 8    Treatment Area: Pain in right knee [M25.561]  Pain in left knee [M25.562]    SUBJECTIVE  Pain Level (0-10 scale): 3  Any medication changes, allergies to medications, adverse drug reactions, diagnosis change, or new procedure performed?: [x] No    [] Yes (see summary sheet for update)  Subjective functional status/changes:   [] No changes reported  Pt reports she has been doing her HEP, wants to make sure she is doing it correctly. OBJECTIVE    10 min Therapeutic Exercise:  [x] See flow sheet : HEP review, hip stretching   Rationale: increase ROM and increase strength to improve the patients ability to be independent with HEP    23 min Therapeutic Activity:  [x]  See flow sheet :   Rationale: increase strength and improve coordination  to improve the patients ability to improve jumping and landing     25 min Neuromuscular Re-education:  [x]  See flow sheet : balance, intrinsic foot, glut nicole    Rationale: increase strength, improve coordination, improve balance and increase proprioception  to improve the patients ability to perform leisure activities without pain          With   [] TE   [] TA   [] neuro   [] other: Patient Education: [x] Review HEP    [] Progressed/Changed HEP based on:   [] positioning   [] body mechanics   [] transfers   [] heat/ice application    [] other:      Other Objective/Functional Measures: initiated exercises per flow sheet     Pain Level (0-10 scale) post treatment: 0    ASSESSMENT/Changes in Function: Pt was able to initiate exercises per flow sheet without increase in symptoms.  Pt was challenged today with finding natural ER for use of ballet first position and intrinsic foot stability due to large navicular drop left > right. Pt benefited from use of mirror feedback for controlling dynamic valgus with single leg activities and jump take off/landing, however, pt remained significantly challenged. Updated HEP to include intrinsic foot strength and positional work. Patient will continue to benefit from skilled PT services to modify and progress therapeutic interventions, address functional mobility deficits, address ROM deficits, address strength deficits, analyze and address soft tissue restrictions, analyze and cue movement patterns, analyze and modify body mechanics/ergonomics, assess and modify postural abnormalities, address imbalance/dizziness and instruct in home and community integration to attain remaining goals. []  See Plan of Care  []  See progress note/recertification  []  See Discharge Summary         Progress towards goals / Updated goals:  Short Term Goals: To be accomplished in 1 weeks:  1. Therapist to establish HEP for strength and ROM to improve ease with ADLs/gait. MET      Long Term Goals: To be accomplished in 4 weeks:  1. Patient will be independent with HEP to improve carryover of functional gains with ADLs between visits. Eval Status:n/a   Reports compliance  2. . Pt will increase FOTO score to 78 points to demonstrate improved functional mobility. Eval Status: FOTO: 61   To be reassessed at [place]day [place]  3. Pt will increase B knee extension/hip extension/ hip flexion/hip abduction strength to grossly 5/5 to improve ease with gait. Eval Status:              Knee extension: 4+/5              Hip extension: 3/5              Hip flexion:4+/5              Hip abduction: 4+/5   Too early to appreciate true strength gains  4. Patient will display proper jump/ jump form without cuing from therapist to improve ease with ballet workouts.      PLAN  [x]  Upgrade activities as tolerated     [x]  Continue plan of care  []  Update interventions per flow sheet       [] Discharge due to:_  []  Other:_      Fredy Aponte, PT 7/9/2020  1:17 PM    Future Appointments   Date Time Provider Elenita Cano   7/14/2020  3:30 PM Jaron Gil, PT [facility]   7/16/2020  3:30 PM Jaron Gil, PT [facility]   7/21/2020  3:30 PM Jaron Gil, PT [facility]   7/23/2020  3:30 PM Jaron Gil, PT [facility]   7/28/2020  3:30 PM Jaron Gil, PT [facility]   7/30/2020  3:30 PM Jaron Gil, PT [facility]

## 2020-07-14 ENCOUNTER — APPOINTMENT (OUTPATIENT)
Dept: PHYSICAL THERAPY | Age: 45
End: 2020-07-14
Payer: COMMERCIAL

## 2020-07-16 ENCOUNTER — HOSPITAL ENCOUNTER (OUTPATIENT)
Dept: PHYSICAL THERAPY | Age: 45
Discharge: HOME | End: 2020-07-16
Payer: COMMERCIAL

## 2020-07-16 PROCEDURE — 97110 THERAPEUTIC EXERCISES: CPT

## 2020-07-16 PROCEDURE — 97112 NEUROMUSCULAR REEDUCATION: CPT

## 2020-07-16 PROCEDURE — 97530 THERAPEUTIC ACTIVITIES: CPT

## 2020-07-16 NOTE — PROGRESS NOTES
PT DAILY TREATMENT NOTE 10-18    Patient Name: Christiano Epps  Date:2020  : 1975  [x]  Patient  Verified  Payor: Bianka Hummel / Plan: [employer] / Product Type: HMO /    In time:328  Out time:415  Total Treatment Time (min): 52  Visit #: 3 of 8  Treatment Area: Pain in right knee [M25.561]  Pain in left knee [M25.562]    SUBJECTIVE  Pain Level (0-10 scale): 0  Any medication changes, allergies to medications, adverse drug reactions, diagnosis change, or new procedure performed?: [x] No    [] Yes (see summary sheet for update)  Subjective functional status/changes:   [] No changes reported  Pt states she felt like she has more ROM with less pain after last visit    OBJECTIVE    10 min Therapeutic Exercise:  [x] See flow sheet :   Rationale: increase ROM and increase strength to improve the patients ability to perform ADLs    25 min Therapeutic Activity:  [x]  See flow sheet :   Rationale: increase strength, improve coordination and increase proprioception  to improve the patients ability to perform ballet jumps without pain     12 min Neuromuscular Re-education:  [x]  See flow sheet : balance, intrinsic foot strength   Rationale: increase strength, improve coordination, improve balance and increase proprioception  to improve the patients ability to tolerate sustained and repeated postures          With   [] TE   [] TA   [] neuro   [] other: Patient Education: [x] Review HEP    [] Progressed/Changed HEP based on:   [] positioning   [] body mechanics   [] transfers   [] heat/ice application    [] other:      Other Objective/Functional Measures: progressed exercises per flow sheet      Pain Level (0-10 scale) post treatment: 0    ASSESSMENT/Changes in Function: Pt tolerated progression of exercises with out increase in symptoms. Pt demonstrates good carry over from previous session. Progressing well with jumping with use intrinsic stability and posterior chain.  Significantly improved stability in SLS due to improved intrinsic foot control     Patient will continue to benefit from skilled PT services to modify and progress therapeutic interventions, address functional mobility deficits, address ROM deficits, address strength deficits, analyze and address soft tissue restrictions, analyze and cue movement patterns, analyze and modify body mechanics/ergonomics, assess and modify postural abnormalities, address imbalance/dizziness and instruct in home and community integration to attain remaining goals. []  See Plan of Care  []  See progress note/recertification  []  See Discharge Summary         Progress towards goals / Updated goals:  Short Term Goals: To be accomplished in 1 weeks:  1. Therapist to establish HEP for strength and ROM to improve ease with ADLs/gait.                MET       Long Term Goals: To be accomplished in 4 weeks:  1. Patient will be independent with HEP to improve carryover of functional gains with ADLs between visits.             Eval Status:n/a              Reports compliance  2. . Pt will increase FOTO score to 78 points to demonstrate improved functional mobility.             Eval Status: FOTO: 61              To be reassessed at 30day [place]  3. Pt will increase B knee extension/hip extension/ hip flexion/hip abduction strength to grossly 5/5 to improve ease with gait.             Eval Status:              Knee extension: 4+/5              Hip extension: 3/5              Hip flexion:4+/5              Hip abduction: 4+/5              Too early to appreciate true strength gains  4.  Patient will display proper jump/ jump form without cuing from therapist to improve ease with ballet workouts.    Progressing with mirror feedback    PLAN  [x]  Upgrade activities as tolerated     [x]  Continue plan of care  []  Update interventions per flow sheet       []  Discharge due to:_  []  Other:_      Gibson Cardenas, PT 7/16/2020  3:33 PM    Future Appointments   Date Time Provider Department [place]   7/21/2020  3:30 PM [facility] [address]   7/23/2020  3:30 PM [facility] [address]   7/28/2020  3:30 PM Raf Lucas, PT [facility] [address]   7/30/2020  3:30 PM Raf Lucas, PT [facility] [address]

## 2020-07-21 ENCOUNTER — HOSPITAL ENCOUNTER (OUTPATIENT)
Dept: PHYSICAL THERAPY | Age: 45
Discharge: HOME | End: 2020-07-21
Payer: COMMERCIAL

## 2020-07-21 PROCEDURE — 97110 THERAPEUTIC EXERCISES: CPT

## 2020-07-21 PROCEDURE — 97112 NEUROMUSCULAR REEDUCATION: CPT

## 2020-07-21 PROCEDURE — 97530 THERAPEUTIC ACTIVITIES: CPT

## 2020-07-21 NOTE — PROGRESS NOTES
PT DAILY TREATMENT NOTE 10-18    Patient Name: Raina Balbuena  Date:2020  : 1975  [x]  Patient  Verified  Payor: Randy Marte / Plan: [address] [place] [address] [employer] / Product Type: HMO /    In time:330  Out time:416  Total Treatment Time (min): 55  Visit #: 4 of 8    Treatment Area: Pain in right knee [M25.561]  Pain in left knee [M25.562]    SUBJECTIVE  Pain Level (0-10 scale): 0  Any medication changes, allergies to medications, adverse drug reactions, diagnosis change, or new procedure performed?: [x] No    [] Yes (see summary sheet for update)  Subjective functional status/changes:   [] No changes reported  Pt reports she has been able to attend dance x1 and do x2 videos since last appointment. Reports feeling significantly improved, stronger, has not been having pain. OBJECTIVE    10 min Therapeutic Exercise:  [x] See flow sheet :   Rationale: increase ROM and increase strength to improve the patients ability to perform ADLs    23 min Therapeutic Activity:  [x]  See flow sheet : jumps   Rationale: increase strength, improve coordination and increase proprioception  to improve the patients ability to dance with less pain     13 min Neuromuscular Re-education:  [x]  See flow sheet : core, glut nicole   Rationale: increase strength, improve coordination, improve balance and increase proprioception  to improve the patients ability to perform dance        With   [] TE   [] TA   [] neuro   [] other: Patient Education: [x] Review HEP    [] Progressed/Changed HEP based on:   [] positioning   [] body mechanics   [] transfers   [] heat/ice application    [] other:      Other Objective/Functional Measures: progressed exercises per flow sheet      Pain Level (0-10 scale) post treatment: 0    ASSESSMENT/Changes in Function: Pt continues to demonstrate excellent carry over of technique from previous treatments. Minimal cuing initially.  Initiated jumping with single leg landings today with challenge to control landings; will need continued instruction     Patient will continue to benefit from skilled PT services to modify and progress therapeutic interventions, address functional mobility deficits, address ROM deficits, address strength deficits, analyze and address soft tissue restrictions, analyze and cue movement patterns, analyze and modify body mechanics/ergonomics, assess and modify postural abnormalities, address imbalance/dizziness and instruct in home and community integration to attain remaining goals. []  See Plan of Care  []  See progress note/recertification  []  See Discharge Summary         Progress towards goals / Updated goals:  Short Term Goals: To be accomplished in 1 weeks:  1. Therapist to establish HEP for strength and ROM to improve ease with ADLs/gait.                 MET       Long Term Goals: To be accomplished in 4 weeks:  1. Patient will be independent with HEP to improve carryover of functional gains with ADLs between visits.             Eval Status:n/a              Reports compliance  2. . Pt will increase FOTO score to 78 points to demonstrate improved functional mobility.             Eval Status: FOTO: 64              To be reassessed at [address]  3. Pt will increase B knee extension/hip extension/ hip flexion/hip abduction strength to grossly 5/5 to improve ease with gait.             Eval Status:              Knee extension: 4+/5              Hip extension: 3/5              Hip flexion:4+/5              Hip abduction: 4+/5              Too early to appreciate true strength gains  4.  Patient will display proper jump/ jump form without cuing from therapist to improve ease with ballet workouts.               Progressing with mirror feedback    PLAN  [x]  Upgrade activities as tolerated     [x]  Continue plan of care  []  Update interventions per flow sheet       []  Discharge due to:_  []  Other:_      Fredy Aponte, PT 7/21/2020  3:43 PM    Future Appointments   Date Time Provider Department [place]   7/28/2020  3:30 PM [facility]   7/30/2020  3:30 PM [facility]

## 2020-07-23 ENCOUNTER — APPOINTMENT (OUTPATIENT)
Dept: PHYSICAL THERAPY | Age: 45
End: 2020-07-23
Payer: COMMERCIAL

## 2020-07-24 ENCOUNTER — HOSPITAL ENCOUNTER (OUTPATIENT)
Dept: PHYSICAL THERAPY | Age: 45
Discharge: HOME | End: 2020-07-24
Payer: COMMERCIAL

## 2020-07-24 PROCEDURE — 97530 THERAPEUTIC ACTIVITIES: CPT

## 2020-07-24 PROCEDURE — 97112 NEUROMUSCULAR REEDUCATION: CPT

## 2020-07-24 PROCEDURE — 97110 THERAPEUTIC EXERCISES: CPT

## 2020-07-28 ENCOUNTER — HOSPITAL ENCOUNTER (OUTPATIENT)
Dept: PHYSICAL THERAPY | Age: 45
Discharge: HOME | End: 2020-07-28
Payer: COMMERCIAL

## 2020-07-28 PROCEDURE — 97112 NEUROMUSCULAR REEDUCATION: CPT

## 2020-07-28 PROCEDURE — 97110 THERAPEUTIC EXERCISES: CPT

## 2020-07-28 PROCEDURE — 97530 THERAPEUTIC ACTIVITIES: CPT

## 2020-07-28 NOTE — PROGRESS NOTES
PT DAILY TREATMENT NOTE 10-18    Patient Name: Carol Galeano  Date:2020  : 1975  [x]  Patient  Verified  Payor: Ela Wiseman / Plan: [employer] / Product Type: HMO /    In time:329  Out time:409  Total Treatment Time (min): 40  Visit #: 6 of 8    Treatment Area: Pain in right knee [M25.561]  Pain in left knee [M25.562]    SUBJECTIVE  Pain Level (0-10 scale): 0  Any medication changes, allergies to medications, adverse drug reactions, diagnosis change, or new procedure performed?: [x] No    [] Yes (see summary sheet for update)  Subjective functional status/changes:   [] No changes reported  Pt reports she has dance on Wednesday. Has no pain today. Pt reports daily compliance with HEP as she keeps them on her bed to remind her. OBJECTIVE    8 min Therapeutic Exercise:  [x] See flow sheet :   Rationale: increase ROM and increase strength to improve the patients ability to perform ADLs    9 min Therapeutic Activity:  [x]  See flow sheet : jumping    Rationale: increase strength and improve coordination  to improve the patients ability to participate in ballet without pain     23 min Neuromuscular Re-education:  [x]  See flow sheet : 1 to 1 jumping, intrinsic foot strength, balance, compliant surfaces   Rationale: increase strength, improve coordination, improve balance and increase proprioception  to improve the patients ability to participate ballet without pain    With   [] TE   [] TA   [] neuro   [] other: Patient Education: [x] Review HEP    [] Progressed/Changed HEP based on:   [] positioning   [] body mechanics   [] transfers   [] heat/ice application    [] other:      Other Objective/Functional Measures: progressed exercises per flow sheet      Pain Level (0-10 scale) post treatment: 0    ASSESSMENT/Changes in Function: Pt tolerated progression of exercises with out increase in symptoms. Pt compliance with HEP evident as pt demonstrates excellent carry over.  Pt required constant cuing regarding left arch collapse to prevent dynamic valgus landing portion of exercise; pt corrects well with verbal cues. Pt able to distribute weight evenly over foot in order to absorb shock during ballet maneuvers without use of mirror feedback this treatment. After verbal cuing, Pt able to push through hallux during single leg heel raise on uneven surface to prevent lateral weight bearing/inversion during PF. Will reassess NV with plans to DC pending symptom stability after dance class on 7/29. Patient will continue to benefit from skilled PT services to modify and progress therapeutic interventions, address functional mobility deficits, address ROM deficits, address strength deficits, analyze and address soft tissue restrictions, analyze and cue movement patterns, analyze and modify body mechanics/ergonomics, assess and modify postural abnormalities, address imbalance/dizziness and instruct in home and community integration to attain remaining goals. []  See Plan of Care  []  See progress note/recertification  []  See Discharge Summary         Progress towards goals / Updated goals:  Short Term Goals: To be accomplished in 1 weeks:  1. Therapist to establish HEP for strength and ROM to improve ease with ADLs/gait.                 MET       Long Term Goals: To be accomplished in 4 weeks:  1. Patient will be independent with HEP to improve carryover of functional gains with ADLs between visits.             Eval Status:n/a              Reports compliance  2. . Pt will increase FOTO score to 78 points to demonstrate improved functional mobility.             Eval Status: FOTO: 64              To be reassessed at [address]  3. Pt will increase B knee extension/hip extension/ hip flexion/hip abduction strength to grossly 5/5 to improve ease with gait.                Eval Status:              Knee extension: 4+/5              Hip extension: 3/5              Hip flexion:4+/5              Hip abduction: 4+/5              Reassess NV  4.  Patient will display proper jump/ jump form without cuing from therapist to improve ease with ballet workouts.               Improving, challenged with single leg landings and take offs to control valgus    PLAN  [x]  Upgrade activities as tolerated     [x]  Continue plan of care  []  Update interventions per flow sheet       []  Discharge due to:_  []  Other:_      Emile Bangura PT 7/28/2020  3:30 PM    Future Appointments   Date Time Provider Elenita Cano   7/30/2020  3:30 PM [facility]

## 2020-07-30 ENCOUNTER — HOSPITAL ENCOUNTER (OUTPATIENT)
Dept: PHYSICAL THERAPY | Age: 45
Discharge: HOME | End: 2020-07-30
Payer: COMMERCIAL

## 2020-07-30 PROCEDURE — 97110 THERAPEUTIC EXERCISES: CPT

## 2020-07-30 NOTE — PROGRESS NOTES
PT DISCHARGE DAILY NOTE AND [id number]    Patient name: Thi Gaytan Start of Care: 2020   Referral source: Joyce Vu MD : 1975                Medical Diagnosis: Pain in right knee [M25.561]  Pain in left knee [M25.562]  Payor: Cruz Tellesbrigid / Plan: [address] [employer] / Product Type: HMO /  Onset Date:Aug 2019                Treatment Diagnosis: B knee pain   Prior Hospitalization: see medical history Provider#: [id number]   Medications: Verified on Patient summary List    Comorbidities: HTN   Prior Level of Function: legal work part-time. Functionally independent, taking ballet class, gym workout, yoga      Visits from Start of Care: 7    Missed Visits: 0    Reporting Period : 20 to 20    Date:2020  : 1975  [x]  Patient  Verified  Payor: Cruz Escobar / Plan: [address] [employer] / Product Type: HMO /    In time:330  Out time:354  Total Treatment Time (min): 24  Visit #: 7 of 8    SUBJECTIVE  Pain Level (0-10 scale): 0  Any medication changes, allergies to medications, adverse drug reactions, diagnosis change, or new procedure performed?: [x] No    [] Yes (see summary sheet for update)  Subjective functional status/changes:   [] No changes reported  \"I'm doing great. \"    OBJECTIVE    24 min Therapeutic Exercise:  [x]?  See flow sheet :   Rationale: increase ROM and increase strength to improve the patients ability to perform ADLs                 With   [] TE   [] TA   [] neuro   [] other: Patient Education: [x] Review HEP    [] Progressed/Changed HEP based on:   [] positioning   [] body mechanics   [] transfers   [] heat/ice application    [] other:      Other Objective/Functional Measures:   Functional Gains: getting up from sitting position, jumping, ballet class, strength and squats, pain is better  Functional Deficits: single leg jumps  % improvement: 80%  Pain   Average: 0/10       Best: 0/10     Worst: 0/10  Patient Goal: \"Keep building up my strength and abilities\"       Pain Level (0-10 scale) post treatment: 0    Summary of Care:  Short Term Goals: To be accomplished in 1 weeks:  1. Therapist to establish HEP for strength and ROM to improve ease with ADLs/gait.                 MET       Long Term Goals: To be accomplished in 4 weeks:  1. Patient will be independent with HEP to improve carryover of functional gains with ADLs between visits.             Eval Status:n/a              MET: Reports compliance  2. . Pt will increase FOTO score to 78 points to demonstrate improved functional mobility.             Eval Status: FOTO: 61              MET: 80  3. Pt will increase B knee extension/hip extension/ hip flexion/hip abduction strength to grossly 5/5 to improve ease with gait.             Eval Status:              Knee extension: 4+/5              Hip extension: 3/5              Hip flexion:4+/5              Hip abduction: 4+/5              MET: 5/5  4. Patient will display proper jump/ jump form without cuing from therapist to improve ease with ballet workouts.               [id number]:  challenged with single leg landings and take offs to control valgus    ASSESSMENT/Changes in Function: Ms. Alta Hui has been a pleasure to work with and has seen excellent response to PT for improve pain and strength. She reports good pain control and no further functional limitations. [facility] at this time for self progression of single leg eccentric control.     Thank you for this referral!      PLAN  [x]Discontinue therapy: [x]Patient has reached or is progressing toward set goals      []Patient is non-compliant or has abdicated      []Due to lack of appreciable progress towards set [id number], PT 7/30/2020  3:34 PM

## 2021-01-26 ENCOUNTER — TRANSCRIBE ORDER (OUTPATIENT)
Dept: SCHEDULING | Age: 46
End: 2021-01-26

## 2021-01-26 DIAGNOSIS — N63.10 MASS OF RIGHT BREAST: Primary | ICD-10-CM

## 2021-02-10 ENCOUNTER — HOSPITAL ENCOUNTER (OUTPATIENT)
Dept: ULTRASOUND IMAGING | Age: 46
Discharge: HOME | End: 2021-02-10
Payer: COMMERCIAL

## 2021-02-10 ENCOUNTER — HOSPITAL ENCOUNTER (OUTPATIENT)
Dept: MAMMOGRAPHY | Age: 46
Discharge: HOME | End: 2021-02-10
Payer: COMMERCIAL

## 2021-02-10 DIAGNOSIS — N63.10 MASS OF RIGHT BREAST: ICD-10-CM

## 2021-02-10 PROCEDURE — 76642 ULTRASOUND BREAST LIMITED: CPT

## 2021-02-10 PROCEDURE — 77061 BREAST TOMOSYNTHESIS UNI: CPT

## 2021-05-18 NOTE — PROGRESS NOTES
PT DAILY TREATMENT NOTE 10-18    Patient Name: Carol Galeano  Date:2020  : 1975  [x]  Patient  Verified  Payor: Ela Wiseman / Plan: [employer] / Product Type: HMO /    In time:927  Out time:1024  Total Treatment Time (min): 62  Visit #: 5 of 8    Treatment Area: Pain in right knee [M25.561]  Pain in left knee [M25.562]    SUBJECTIVE  Pain Level (0-10 scale): 0  Any medication changes, allergies to medications, adverse drug reactions, diagnosis change, or new procedure performed?: [x] No    [] Yes (see summary sheet for update)  Subjective functional status/changes:   [] No changes reported  \"Whatever we are doing is working. \"    OBJECTIVE    8 min Therapeutic Exercise:  [x] See flow sheet :   Rationale: increase ROM and increase strength to improve the patients ability to improve turn out for ballet     39 min Therapeutic Activity:  [x]  See flow sheet : jumping progressions   Rationale: increase strength, improve coordination and increase proprioception  to improve the patients ability to decrease pain with jumping     10 min Neuromuscular Re-education:  [x]  See flow sheet : balance, glut reeducation   Rationale: increase strength, improve coordination, improve balance and increase proprioception  to improve the patients ability to control           With   [] TE   [] TA   [] neuro   [] other: Patient Education: [x] Review HEP    [] Progressed/Changed HEP based on:   [] positioning   [] body mechanics   [] transfers   [] heat/ice application    [] other:      Other Objective/Functional Measures: updated program and HEP for increased glut med and ER strength/flexibility      Pain Level (0-10 scale) post treatment: 0    ASSESSMENT/Changes in Function: Pt progressing well with bilateral jumping and 2 to 1 jumps. Initiated 1 to 1 jumps today. Overall pt is challenged to control valgus on take off but more challenged in landings, left more than right.  In addition, as skill difficulty increases pt requires increased cuing to maintain positioning and core control. Patient will continue to benefit from skilled PT services to modify and progress therapeutic interventions, address functional mobility deficits, address ROM deficits, address strength deficits, analyze and address soft tissue restrictions, analyze and cue movement patterns, analyze and modify body mechanics/ergonomics, assess and modify postural abnormalities, address imbalance/dizziness and instruct in home and community integration to attain remaining goals. []  See Plan of Care  []  See progress note/recertification  []  See Discharge Summary         Progress towards goals / Updated goals:  Short Term Goals: To be accomplished in 1 weeks:  1. Therapist to establish HEP for strength and ROM to improve ease with ADLs/gait.                 MET       Long Term Goals: To be accomplished in 4 weeks:  1. Patient will be independent with HEP to improve carryover of functional gains with ADLs between visits.             Eval Status:n/a              Reports compliance  2. . Pt will increase FOTO score to 78 points to demonstrate improved functional mobility.             Eval Status: FOTO: 64              To be reassessed at [address]  3. Pt will increase B knee extension/hip extension/ hip flexion/hip abduction strength to grossly 5/5 to improve ease with gait.             Eval Status:              Knee extension: 4+/5              Hip extension: 3/5              Hip flexion:4+/5              Hip abduction: 4+/5              Tolerating progression of exercises  4.  Patient will display proper jump/ jump form without cuing from therapist to improve ease with ballet workouts.    Improving, challenged with single leg landings and take offs to control valgus    PLAN  [x]  Upgrade activities as tolerated     [x]  Continue plan of care  []  Update interventions per flow sheet       []  Discharge due to:_  []  Other:_      Tavon Reyes NITESH 7/24/2020  9:28 AM    Future Appointments   Date Time Provider Elenita Cano   7/24/2020  9:30 AM Luanne Monroe, PT [facility]   7/28/2020  3:30 PM Luanne Monroe, PT [facility]   7/30/2020  3:30 PM Tristen Antony PT [facility] normal...

## 2021-07-07 ENCOUNTER — TRANSCRIBE ORDER (OUTPATIENT)
Dept: SCHEDULING | Age: 46
End: 2021-07-07

## 2021-07-07 DIAGNOSIS — N63.10 MASS OF RIGHT BREAST: Primary | ICD-10-CM

## 2021-07-08 ENCOUNTER — TRANSCRIBE ORDER (OUTPATIENT)
Dept: SCHEDULING | Age: 46
End: 2021-07-08

## 2021-07-08 DIAGNOSIS — Z12.31 SCREENING MAMMOGRAM FOR HIGH-RISK PATIENT: Primary | ICD-10-CM

## 2021-07-20 ENCOUNTER — HOSPITAL ENCOUNTER (OUTPATIENT)
Dept: MAMMOGRAPHY | Age: 46
Discharge: HOME | End: 2021-07-20
Payer: COMMERCIAL

## 2021-07-20 DIAGNOSIS — Z12.31 SCREENING MAMMOGRAM FOR HIGH-RISK PATIENT: ICD-10-CM

## 2021-07-20 PROCEDURE — 77063 BREAST TOMOSYNTHESIS BI: CPT

## 2021-08-03 ENCOUNTER — TRANSCRIBE ORDER (OUTPATIENT)
Dept: SCHEDULING | Age: 46
End: 2021-08-03

## 2021-08-03 DIAGNOSIS — N63.20 BREAST MASS, LEFT: Primary | ICD-10-CM

## 2021-08-18 ENCOUNTER — HOSPITAL ENCOUNTER (OUTPATIENT)
Dept: MAMMOGRAPHY | Age: 46
Discharge: HOME | End: 2021-08-18
Payer: COMMERCIAL

## 2021-08-18 ENCOUNTER — HOSPITAL ENCOUNTER (OUTPATIENT)
Dept: ULTRASOUND IMAGING | Age: 46
Discharge: HOME | End: 2021-08-18
Payer: COMMERCIAL

## 2021-08-18 DIAGNOSIS — N63.20 BREAST MASS, LEFT: ICD-10-CM

## 2021-08-18 PROCEDURE — 77061 BREAST TOMOSYNTHESIS UNI: CPT

## 2021-08-18 PROCEDURE — 76642 ULTRASOUND BREAST LIMITED: CPT

## 2022-05-17 ENCOUNTER — HOSPITAL ENCOUNTER (OUTPATIENT)
Dept: LAB | Age: 47
Discharge: HOME | End: 2022-05-17

## 2022-05-17 LAB — XX-LABCORP SPECIMEN COL,LCBCF: NORMAL

## 2022-05-17 PROCEDURE — 99001 SPECIMEN HANDLING PT-LAB: CPT

## 2022-06-22 ENCOUNTER — HOSPITAL ENCOUNTER (OUTPATIENT)
Dept: LAB | Age: 47
Discharge: HOME | End: 2022-06-22

## 2022-06-22 LAB — XX-LABCORP SPECIMEN COL,LCBCF: NORMAL

## 2022-06-22 PROCEDURE — 99001 SPECIMEN HANDLING PT-LAB: CPT

## 2022-07-05 ENCOUNTER — TRANSCRIBE ORDER (OUTPATIENT)
Dept: SCHEDULING | Age: 47
End: 2022-07-05

## 2022-07-05 DIAGNOSIS — Z12.31 VISIT FOR SCREENING MAMMOGRAM: Primary | ICD-10-CM

## 2022-07-23 ENCOUNTER — HOSPITAL ENCOUNTER (OUTPATIENT)
Dept: MAMMOGRAPHY | Age: 47
Discharge: HOME | End: 2022-07-23
Payer: COMMERCIAL

## 2022-07-23 DIAGNOSIS — Z12.31 VISIT FOR SCREENING MAMMOGRAM: ICD-10-CM

## 2022-07-23 PROCEDURE — 77063 BREAST TOMOSYNTHESIS BI: CPT

## 2022-08-01 ENCOUNTER — HOSPITAL ENCOUNTER (OUTPATIENT)
Dept: LAB | Age: 47
Discharge: HOME | End: 2022-08-01

## 2022-08-01 LAB — XX-LABCORP SPECIMEN COL,LCBCF: NORMAL

## 2022-08-01 PROCEDURE — 99001 SPECIMEN HANDLING PT-LAB: CPT

## 2023-02-27 ENCOUNTER — HOSPITAL ENCOUNTER (EMERGENCY)
Facility: HOSPITAL | Age: 48
Discharge: HOME | End: 2023-02-27
Attending: EMERGENCY MEDICINE
Payer: COMMERCIAL

## 2023-02-27 VITALS
TEMPERATURE: 98.2 F | SYSTOLIC BLOOD PRESSURE: 179 MMHG | RESPIRATION RATE: 18 BRPM | DIASTOLIC BLOOD PRESSURE: 103 MMHG | HEIGHT: 67 IN | OXYGEN SATURATION: 100 % | BODY MASS INDEX: 26.21 KG/M2 | HEART RATE: 72 BPM | WEIGHT: 167 LBS

## 2023-02-27 DIAGNOSIS — I10 HYPERTENSION, UNSPECIFIED TYPE: Primary | ICD-10-CM

## 2023-02-27 PROCEDURE — 99282 EMERGENCY DEPT VISIT SF MDM: CPT

## 2023-02-27 NOTE — ED TRIAGE NOTES
Patient has hx of HTN and has been running high all day.      BP meds recently changed    Endorses a HA at this time    Denies any CP or SOB

## 2023-03-03 NOTE — ED PROVIDER NOTES
EMERGENCY DEPARTMENT HISTORY AND PHYSICAL EXAM      Date: 2/27/2023  Patient Name: Sajan Harris    History of Presenting Illness     Chief Complaint   Patient presents with    Hypertension       History Provided By: Patient    HPI: Sajan Harris, 50 y.o. female with PMHx as noted below presents the emergency department with chief complaint of high blood pressure. Patient reports that she had recently switched blood pressure medications and over the last day has noticed her blood pressure to be significantly elevated. Patient states she is entirely asymptomatic at this time and is presenting only because of the elevated blood pressure. Patient states she had previously been on losartan hydrochlorothiazide that been controlling her blood pressure well but has been switched to labetalol. Pt denies any other alleviating or exacerbating factors. Additionally, pt specifically denies any recent fever, chills, headache, nausea, vomiting, abdominal pain, CP, SOB, lightheadedness, dizziness, numbness, weakness, BLE swelling, heart palpitations,    PCP: Flor Triana MD    No current facility-administered medications for this encounter. No current outpatient medications on file. Past History     Past Medical History:  Hypertension    Past Surgical History:  No past surgical history on file. Family History:  No family history on file. Social History: Allergies:  No Known Allergies      Review of Systems   Review of Systems  Pertinent positives and negatives in HPI    Physical Exam   Physical Exam    GENERAL: alert and oriented, no acute distress  EYES: PEERL, No injection, discharge or icterus. ENT: Mucous membranes pink and moist.  NECK: Supple  LUNGS: Airway patent. Non-labored respirations. Breath sounds clear with good air entry bilaterally. HEART: Regular rate and rhythm. No peripheral edema  ABDOMEN: Non-distended and non-tender, without guarding or rebound.   SKIN:  warm, dry  MSK/EXTREMITIES: Without swelling, tenderness or deformity, symmetric with normal ROM  NEUROLOGICAL: Alert, oriented      Diagnostic Study Results     Labs -   No results found for this or any previous visit (from the past 12 hour(s)). Radiologic Studies -   No orders to display           Medical Decision Making       I reviewed the vital signs, available nursing notes, past medical history, past surgical history, family history and social history. Vital Signs-Reviewed the patient's vital signs. Provider Notes (Medical Decision Making): On presentation the patient is well appearing, in no acute distress with vital signs notable for hypertension. Patient is having no chest pain, abd pain, LE swelling, dyspnea, orthopnea, change in UOP, severe headaches, visual changes or confusion to suggest end organ damage resultant from hypertensive emergency so further workup/lowering of her blood pressure in the Emergency Department is not indicated. we did discuss obtaining a BMP to evaluate renal function however patient would like to defer at this time as she states she has had blood work within the last 2 weeks that was within normal limits. Discussed options with the patient and will restart her previous blood pressure medication as it seems well controlled on this combination and she was having no significant side effects. Patient still has this medication at home. I Discussed with patient at length the need for blood pressure re-evaluation and management with primary care  I  Had an extensive discussion on the necessity of outpatient follow up for his high blood pressure and that if this is not done in a timely manner that his high blood pressure may lead to serious and potentially life threatening medical problems. I also instructed the patient on proper exercise and dietary changes that may help his blood pressure   The patient expressed understanding of this and will follow up as instructed. ED Course:   Initial assessment performed. The patients presenting problems have been discussed, and they are in agreement with the care plan formulated and outlined with them. I have encouraged them to ask questions as they arise throughout their visit. Patient was given the following medications:  Medications - No data to display           Stan Herbert's  results have been reviewed with her. She has been counseled regarding her diagnosis. She verbally conveys understanding and agreement of the signs, symptoms, diagnosis, treatment and prognosis and additionally agrees to follow up as recommended with Dr. Kevin Donaldson MD in 24 - 48 hours. She also agrees with the care-plan and conveys that all of her questions have been answered. I have also put together some discharge instructions for her that include: 1) educational information regarding their diagnosis, 2) how to care for their diagnosis at home, as well a 3) list of reasons why they would want to return to the ED prior to their follow-up appointment, should their condition change. Disposition:  home    PLAN:  1. DISCHARGE MEDICATIONS:  There are no discharge medications for this patient. DISCONTINUED MEDICATIONS:  There are no discharge medications for this patient. PATIENT REFERRED TO:  Follow Up with: Kevin Donaldson MD  [address]  [phone]    Schedule an appointment as soon as possible for a visit in 1 day      [facility] EMERGENCY DEPT  [address] [facility]  [phone]    If symptoms worsen      Return to ED if worse     Diagnosis     Clinical Impression:   1. Hypertension, unspecified type          I, Lucrecia Perez MD am the first provider for this patient and am the attending of record for this patient encounter. Lucrecia Perez MD        Please note that this dictation was completed with Dragon, computer voice recognition software.   Quite often unanticipated grammatical, syntax, homophones, and other interpretive errors are inadvertently transcribed by the computer software. Please disregard these errors. Additionally, please excuse any errors that have escaped final proofreading.           Amaya Curtis MD  03/03/23 [id number]

## 2023-07-05 ENCOUNTER — TRANSCRIBE ORDERS (OUTPATIENT)
Facility: HOSPITAL | Age: 48
End: 2023-07-05

## 2023-07-05 DIAGNOSIS — Z12.31 VISIT FOR SCREENING MAMMOGRAM: Primary | ICD-10-CM

## 2023-08-08 ENCOUNTER — HOSPITAL ENCOUNTER (OUTPATIENT)
Facility: HOSPITAL | Age: 48
Discharge: HOME | End: 2023-08-11
Payer: COMMERCIAL

## 2023-08-08 DIAGNOSIS — Z12.31 VISIT FOR SCREENING MAMMOGRAM: ICD-10-CM

## 2023-08-08 PROCEDURE — 77067 SCR MAMMO BI INCL CAD: CPT

## 2023-08-08 PROCEDURE — 77063 BREAST TOMOSYNTHESIS BI: CPT

## 2024-07-18 ENCOUNTER — TRANSCRIBE ORDERS (OUTPATIENT)
Facility: HOSPITAL | Age: 49
End: 2024-07-18

## 2024-07-18 DIAGNOSIS — Z12.31 SCREENING MAMMOGRAM FOR HIGH-RISK PATIENT: Primary | ICD-10-CM

## 2024-09-04 ENCOUNTER — HOSPITAL ENCOUNTER (OUTPATIENT)
Facility: HOSPITAL | Age: 49
Discharge: HOME | End: 2024-09-07
Payer: COMMERCIAL

## 2024-09-04 DIAGNOSIS — Z12.31 SCREENING MAMMOGRAM FOR HIGH-RISK PATIENT: ICD-10-CM

## 2024-09-04 PROCEDURE — 77063 BREAST TOMOSYNTHESIS BI: CPT

## 2024-09-10 ENCOUNTER — HOSPITAL ENCOUNTER (OUTPATIENT)
Facility: HOSPITAL | Age: 49
Setting detail: RECURRING SERIES
Discharge: HOME | End: 2024-09-13
Payer: COMMERCIAL

## 2024-09-10 PROCEDURE — 97161 PT EVAL LOW COMPLEX 20 MIN: CPT
